# Patient Record
Sex: FEMALE | Race: WHITE | NOT HISPANIC OR LATINO | Employment: UNEMPLOYED | URBAN - METROPOLITAN AREA
[De-identification: names, ages, dates, MRNs, and addresses within clinical notes are randomized per-mention and may not be internally consistent; named-entity substitution may affect disease eponyms.]

---

## 2024-07-19 ENCOUNTER — HOSPITAL ENCOUNTER (OUTPATIENT)
Dept: RADIOLOGY | Facility: CLINIC | Age: 84
Discharge: HOME | End: 2024-07-19
Payer: MEDICARE

## 2024-07-19 VITALS — HEIGHT: 62 IN | WEIGHT: 135 LBS | BODY MASS INDEX: 24.84 KG/M2

## 2024-07-19 DIAGNOSIS — Z12.31 ENCOUNTER FOR SCREENING MAMMOGRAM FOR MALIGNANT NEOPLASM OF BREAST: ICD-10-CM

## 2024-07-19 PROCEDURE — 77067 SCR MAMMO BI INCL CAD: CPT

## 2025-07-26 ENCOUNTER — OFFICE VISIT (OUTPATIENT)
Dept: URGENT CARE | Age: 85
End: 2025-07-26
Payer: MEDICARE

## 2025-07-26 VITALS
RESPIRATION RATE: 16 BRPM | HEART RATE: 95 BPM | DIASTOLIC BLOOD PRESSURE: 83 MMHG | SYSTOLIC BLOOD PRESSURE: 123 MMHG | TEMPERATURE: 98.2 F | OXYGEN SATURATION: 96 %

## 2025-07-26 DIAGNOSIS — T14.8XXA BRUISE: Primary | ICD-10-CM

## 2025-07-26 RX ORDER — LEVOTHYROXINE SODIUM 75 UG/1
75 TABLET ORAL DAILY
COMMUNITY
Start: 2018-05-29

## 2025-07-26 RX ORDER — AMITRIPTYLINE HYDROCHLORIDE 25 MG/1
25 TABLET, FILM COATED ORAL NIGHTLY
COMMUNITY
Start: 2024-09-09

## 2025-07-26 RX ORDER — LOSARTAN POTASSIUM 25 MG/1
25 TABLET ORAL
COMMUNITY
Start: 2017-01-05

## 2025-07-26 RX ORDER — SIMVASTATIN 20 MG/1
20 TABLET, FILM COATED ORAL DAILY
COMMUNITY

## 2025-07-26 ASSESSMENT — ENCOUNTER SYMPTOMS
COLOR CHANGE: 1
CONSTITUTIONAL NEGATIVE: 1

## 2025-07-26 ASSESSMENT — PAIN SCALES - GENERAL: PAINLEVEL_OUTOF10: 0-NO PAIN

## 2025-07-26 NOTE — PATIENT INSTRUCTIONS
Area should continue to shrink    Scabbed lesion will fall on own    Monitor for development of pain    Monitor for development of red bullseye lesions over the body

## 2025-07-26 NOTE — PROGRESS NOTES
Subjective   Patient ID: Karolyn Hill is a 85 y.o. female. They present today with a chief complaint of Other (Pt states x3days rt knee bump, possible tick bite. Bruising ).    History of Present Illness  85-year-old female presents to the clinic today with complaints of spot to the right knee.  Patient states that it looks like it has been there for the last 3 to 4 days.  She has bruising on her arms and the area looks to be a bruise as well however family was currently noticed possibly a tick bite.  Bull's-eye lesions anywhere.  Denies body aches or chills.  Denies pain to the area.          Past Medical History  Allergies as of 07/26/2025 - never reviewed   Allergen Reaction Noted    Codeine GI Upset 07/26/2025    Ciprofloxacin Rash 10/25/2021       Prescriptions Prior to Admission[1]     Medical History[2]    Surgical History[3]     reports that she has never smoked. She has never used smokeless tobacco. She reports that she does not use drugs.    Review of Systems  Review of Systems   Constitutional: Negative.    Skin:  Positive for color change.                                  Objective    Vitals:    07/26/25 1802   BP: 123/83   Pulse: 95   Resp: 16   Temp: 36.8 °C (98.2 °F)   TempSrc: Oral   SpO2: 96%     No LMP recorded. Patient has had a hysterectomy.    Physical Exam  Constitutional:       Appearance: Normal appearance.     Skin:     Comments: Small ecchymotic area to right knee, slightly clear centrally, no bull's-eye, small scab to the middle     Neurological:      Mental Status: She is alert.         Procedures    Point of Care Test & Imaging Results from this visit  No results found for this visit on 07/26/25.   Imaging  No results found.    Cardiology, Vascular, and Other Imaging  No other imaging results found for the past 2 days      Diagnostic study results (if any) were reviewed by Boris Espinal PA-C.    Assessment/Plan   Allergies, medications, history, and pertinent labs/EKGs/Imaging reviewed  by Boris Espinal PA-C.     Medical Decision Making  Patient pleasant cooperative on examination.    On examination there is a small ecchymotic area to the right knee.  It is not a bull's-eye in nature.  Is not erythematous.  There is a small scab centrally.  May have been trauma or from itching.  Patient has other small bruises around the extremities.  No other noted bull's-eye lesions.    Advised her to monitor for worsening symptoms or spread of lesions.    Patient in agreement with plan.  Patient alert and oriented, no acute distress upon discharge.    Orders and Diagnoses  Diagnoses and all orders for this visit:  [facility]      Medical Admin Record      Patient disposition: Home    Electronically signed by Boris Espinal PA-C  6:22 PM           [1] (Not in a hospital admission)  [2]   Past Medical History:  Diagnosis Date    Encounter for other preprocedural examination     Preop testing   [3]   Past Surgical History:  Procedure Laterality Date    APPENDECTOMY  12/03/2014    Appendectomy    BREAST LUMPECTOMY  06/22/2018    Breast Surgery Lumpectomy    MOUTH SURGERY  12/03/2014    Oral Surgery Tooth Extraction    OOPHORECTOMY  12/03/2014    Oophorectomy - Unilateral (Removal Of One Ovary)    TOTAL ABDOMINAL HYSTERECTOMY  12/03/2014    Total Abdominal Hysterectomy